# Patient Record
Sex: FEMALE | Race: WHITE
[De-identification: names, ages, dates, MRNs, and addresses within clinical notes are randomized per-mention and may not be internally consistent; named-entity substitution may affect disease eponyms.]

---

## 2017-02-24 ENCOUNTER — HOSPITAL ENCOUNTER (OUTPATIENT)
Dept: HOSPITAL 45 - C.PAPS | Age: 52
Discharge: HOME | End: 2017-02-24
Attending: NURSE PRACTITIONER
Payer: COMMERCIAL

## 2017-02-24 DIAGNOSIS — Z01.419: Primary | ICD-10-CM

## 2017-10-03 ENCOUNTER — HOSPITAL ENCOUNTER (OUTPATIENT)
Dept: HOSPITAL 45 - C.MAMM | Age: 52
Discharge: HOME | End: 2017-10-03
Attending: GENERAL PRACTICE
Payer: COMMERCIAL

## 2017-10-03 DIAGNOSIS — Z12.31: Primary | ICD-10-CM

## 2017-10-04 NOTE — MAMMOGRAPHY REPORT
BILATERAL DIGITAL SCREENING MAMMOGRAM TOMOSYNTHESIS WITH CAD: 10/3/2017

CLINICAL HISTORY: Routine screening.  Patient has no complaints.  





TECHNIQUE:  Breast tomosynthesis in addition to standard 2D mammography was performed. Current study 
was also evaluated with a Computer Aided Detection (CAD) system.  



COMPARISON: Comparison is made to exams dated:  9/19/2016 mammogram, 7/8/2015 mammogram, 1/15/2013 ma
mmogram, 9/21/2011 mammogram, 9/8/2010 mammogram - [facility], and 6/24/2009.   



BREAST COMPOSITION:  The tissue of both breasts is extremely dense, which lowers the sensitivity of m
ammography.  



FINDINGS:  The parenchymal pattern is unchanged.  A small grouping of punctate microcavitation micro-
calcifications in the right breast is unchanged mammographically dating back to at least 06/24/2009, 
therefore likely benign.  No developing mass, architectural distortion or cluster of suspicious micro
calcifications is seen in either breast.  





IMPRESSION:  ACR BI-RADS CATEGORY 2: BENIGN

There is no mammographic evidence of malignancy. A 1 year screening mammogram is recommended.  The pa
tient will receive written notification of the results.  





Approximately 10% of breast cancers are not detected with mammography. A negative mammographic report
 should not delay biopsy if a clinically suggestive mass is present.



Colette Joshi M.D.          

ay/:10/3/2017 16:30:54  



Imaging Technologist: Breana DOS SANTOS)(JADYN)([place]), [facility]

letter sent: Normal 1/2  

BI-RADS Code: ACR BI-RADS Category 2: Benign

## 2018-02-21 ENCOUNTER — HOSPITAL ENCOUNTER (OUTPATIENT)
Dept: HOSPITAL 45 - C.PAPS | Age: 53
Discharge: HOME | End: 2018-02-21
Attending: OBSTETRICS & GYNECOLOGY
Payer: COMMERCIAL

## 2018-02-21 DIAGNOSIS — Z01.419: Primary | ICD-10-CM
